# Patient Record
Sex: FEMALE | Race: WHITE | NOT HISPANIC OR LATINO | Employment: OTHER | URBAN - METROPOLITAN AREA
[De-identification: names, ages, dates, MRNs, and addresses within clinical notes are randomized per-mention and may not be internally consistent; named-entity substitution may affect disease eponyms.]

---

## 2021-06-02 ENCOUNTER — RECORDS - HEALTHEAST (OUTPATIENT)
Dept: ADMINISTRATIVE | Facility: CLINIC | Age: 66
End: 2021-06-02

## 2024-02-09 ENCOUNTER — TRANSFERRED RECORDS (OUTPATIENT)
Dept: HEALTH INFORMATION MANAGEMENT | Facility: CLINIC | Age: 69
End: 2024-02-09

## 2024-02-09 ENCOUNTER — MEDICAL CORRESPONDENCE (OUTPATIENT)
Dept: HEALTH INFORMATION MANAGEMENT | Facility: CLINIC | Age: 69
End: 2024-02-09

## 2024-04-09 ENCOUNTER — MEDICAL CORRESPONDENCE (OUTPATIENT)
Dept: HEALTH INFORMATION MANAGEMENT | Facility: CLINIC | Age: 69
End: 2024-04-09
Payer: COMMERCIAL

## 2024-04-09 ENCOUNTER — TRANSFERRED RECORDS (OUTPATIENT)
Dept: HEALTH INFORMATION MANAGEMENT | Facility: CLINIC | Age: 69
End: 2024-04-09
Payer: COMMERCIAL

## 2024-04-15 ENCOUNTER — OFFICE VISIT (OUTPATIENT)
Dept: CARDIOLOGY | Facility: CLINIC | Age: 69
End: 2024-04-15
Payer: COMMERCIAL

## 2024-04-15 VITALS
RESPIRATION RATE: 16 BRPM | DIASTOLIC BLOOD PRESSURE: 64 MMHG | SYSTOLIC BLOOD PRESSURE: 130 MMHG | OXYGEN SATURATION: 98 % | HEART RATE: 63 BPM | WEIGHT: 98.4 LBS

## 2024-04-15 DIAGNOSIS — I05.0 RHEUMATIC MITRAL STENOSIS: ICD-10-CM

## 2024-04-15 DIAGNOSIS — I10 PRIMARY HYPERTENSION: ICD-10-CM

## 2024-04-15 DIAGNOSIS — I06.1 RHEUMATIC AORTIC INSUFFICIENCY: Primary | ICD-10-CM

## 2024-04-15 PROCEDURE — 99203 OFFICE O/P NEW LOW 30 MIN: CPT | Performed by: INTERNAL MEDICINE

## 2024-04-15 RX ORDER — ACETAMINOPHEN 500 MG
1000 TABLET ORAL
COMMUNITY
Start: 2023-02-24

## 2024-04-15 RX ORDER — LANOLIN ALCOHOL/MO/W.PET/CERES
CREAM (GRAM) TOPICAL DAILY
COMMUNITY
Start: 2023-10-01

## 2024-04-15 RX ORDER — IPRATROPIUM BROMIDE 21 UG/1
2 SPRAY, METERED NASAL DAILY
COMMUNITY
Start: 2023-10-23

## 2024-04-15 RX ORDER — MELOXICAM 15 MG/1
15 TABLET ORAL PRN
COMMUNITY
Start: 2024-02-01

## 2024-04-15 RX ORDER — SODIUM PHOSPHATE,MONO-DIBASIC 19G-7G/118
1 ENEMA (ML) RECTAL DAILY
COMMUNITY

## 2024-04-15 RX ORDER — TRAZODONE HYDROCHLORIDE 50 MG/1
50 TABLET, FILM COATED ORAL
COMMUNITY
Start: 2024-01-02

## 2024-04-15 RX ORDER — FLUTICASONE PROPIONATE AND SALMETEROL 100; 50 UG/1; UG/1
1 POWDER RESPIRATORY (INHALATION) EVERY 12 HOURS
COMMUNITY

## 2024-04-15 RX ORDER — DICYCLOMINE HYDROCHLORIDE 10 MG/1
CAPSULE ORAL EVERY MORNING
COMMUNITY
Start: 2024-03-01

## 2024-04-15 RX ORDER — ESTRADIOL 0.5 MG/1
0.5 TABLET ORAL DAILY
COMMUNITY
Start: 2023-10-23

## 2024-04-15 RX ORDER — AMLODIPINE BESYLATE 5 MG/1
5 TABLET ORAL DAILY
COMMUNITY

## 2024-04-15 RX ORDER — LEVOTHYROXINE SODIUM 50 UG/1
50 TABLET ORAL DAILY
COMMUNITY
Start: 2023-10-24

## 2024-04-15 NOTE — LETTER
4/15/2024    Soledad Rashid MD  [facility] [address] [place] [id number]    RE: Paola Austin       Dear Colleague,     I had the pleasure of seeing Paola Austin in the [facility].      Thank you, Dr. Soledad Rashid, for asking the [facility] team to see Ms. Paola Austin to evaluate rheumatic valve disease.    Assessment/Recommendations   Assessment:    1.  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  She did have a history of rheumatic fever as a child which confirms the origin.  Had never been told that she had a heart murmur until recently.  No symptoms of exertional dyspnea, orthopnea, PND but does note some occasional palpitations which happen about once per month.  She does have evidence of severe left atrial enlargement and is at risk for the development of atrial fibrillation.  For now we will continue to monitor her for any increase in symptoms and if these occur, I would favor a 14 to 30-day event monitor.  With regards to SBE prophylaxis, she is considered intermediate risk and thus does not have to have prophylaxis.  If she is more comfortable in doing so, would look to treat her with azithromycin or doxycycline as she reports an allergy to penicillin.  2.  Essential hypertension, controlled with amlodipine.  If her blood pressure begins to run consistently greater than 130 systolic, I would favor increasing amlodipine to 7.5 mg daily to help minimize aortic insufficiency.    Plan:  1.  Continue current medications  2.  Consider increasing amlodipine to 7.5 mg daily if blood pressure begins to run greater than 130 systolic  3.  Plan echocardiogram in 1 year to follow-up on her valve disease.  Consider event monitor if she begins to note increased symptoms of palpitations.       History of Present Illness    Ms. Paola Austin is a 68 year old female with history of  rheumatic fever as a child, essential hypertension who was recently noted to have a heart murmur on examination.  This led to an echocardiogram demonstrating evidence of rheumatic mitral stenosis which is mild to moderate in severity, aortic valve sclerosis with mild to moderate insufficiency and severe left atrial enlargement.  She is now referred here for further recommendation.  Denies any symptoms of exertional dyspnea or decline in exercise capacity.  Also denies any excessive fatigue.  No history of orthopnea, PND although occasionally will have some ankle edema.  Has noted some palpitations occurring about once per month.  Describes it as a flip-flop in her chest which is most notable when she is laying in bed at night.    ECG (personally reviewed): No ECG    Cardiac Imaging Studies (personally reviewed):   Narrative  Performed by [employer]  Summary    1. Normal left ventricular chamber size with no resting regional wall motion abnormalities and calculated ejection fraction 59%.    2. Normal right ventricular chamber size and systolic function.    3. Normal estimated peak right ventricular/pulmonary artery systolic pressure (34 mmHg).    4. Severe left atrial and mild right atrial chamber enlargement.    5. Rheumatic/postinfllammatory changes of the mitral valve with mild-moderate mitral valve regurgitation and moderate mitral valve stenosis (mean diastolic gradient 5 mmHg with valve area 1.7 by planimetry).    6. Sclerotic aortic valve with mild-moderate aortic valve regurgitation with no stenosis (mean systolic gradient 6 mmhg).    7. Normal-caliber inferior vena cava with reduced inspiratory collapse consistent with high-normal central venous pressures.    8. A prior study is not available for comparison.       Physical Examination Review of Systems   /64 (BP Location: Left arm, Patient Position: Sitting, Cuff Size: Adult Small)   Pulse 63   Resp 16   Wt 44.6 kg (98 lb 6.4 oz)   SpO2 98%   There is  no height or weight on file to calculate BMI.  Wt Readings from Last 3 Encounters:   04/15/24 44.6 kg (98 lb 6.4 oz)     General Appearance:   Awake, Alert, No acute distress.   HEENT:  No scleral icterus; the mucous membranes were pink and moist.   Neck: No cervical bruits or jugular venous distention    Chest: The spine was straight. The chest was symmetric.   Lungs:   Respirations unlabored; the lungs are clear to auscultation. No wheezing   Cardiovascular:   Rate and rhythm.  S1 normal, S2 normal.  2/6 crescendo decrescendo systolic murmur heard best at the left upper sternal border.  No significant diastolic murmur appreciated.   Abdomen:  No organomegaly, masses, bruits, or tenderness. Bowels sounds are present   Extremities: Trace foot edema bilaterally.   Skin: No xanthelasma. Warm, Dry.   Musculoskeletal: No tenderness.   Neurologic: Mood and affect are appropriate.    Enc Vitals  BP: 130/64  Pulse: 63  Resp: 16  SpO2: 98 %  Weight: 44.6 kg (98 lb 6.4 oz)                                         Medical History  Surgical History Family History Social History   Past Medical History:   Diagnosis Date    Aortic valve disorder     aortic insufficiency    Mitral valve stenosis     Palpitations     Rheumatic fever with heart involvement     No past surgical history on file. Family History   Problem Relation Age of Onset    Multiple Sclerosis Sister     Breast Cancer Sister     Social History     Socioeconomic History    Marital status:      Spouse name: Not on file    Number of children: Not on file    Years of education: Not on file    Highest education level: Not on file   Occupational History    Not on file   Tobacco Use    Smoking status: Former     Current packs/day: 0.00     Types: Cigarettes     Quit date:      Years since quittin.3    Smokeless tobacco: Never   Substance and Sexual Activity    Alcohol use: Never    Drug use: Not on file    Sexual activity: Not on file   Other Topics Concern  "   Not on file   Social History Narrative    Not on file     Social Determinants of Health     Financial Resource Strain: Not on file   Food Insecurity: Not on file   Transportation Needs: Not on file   Physical Activity: Not on file   Stress: Not on file   Social Connections: Not on file   Interpersonal Safety: Not on file   Housing Stability: Not on file          Medications  Allergies   Current Outpatient Medications   Medication Sig Dispense Refill    acetaminophen (TYLENOL) 500 MG tablet Take 1,000 mg by mouth      amLODIPine (NORVASC) 5 MG tablet Take 5 mg by mouth daily      cholecalciferol (VITAMIN D3) 25 mcg (1000 units) capsule Take by mouth daily      cyanocobalamin (VITAMIN B-12) 1000 MCG tablet Take by mouth daily      dicyclomine (BENTYL) 10 MG capsule Take by mouth every morning      estradiol (ESTRACE) 0.5 MG tablet Take 0.5 mg by mouth daily      fluticasone-salmeterol (ADVAIR) 100-50 MCG/ACT inhaler Inhale 1 puff into the lungs every 12 hours      glucosamine-chondroitin 500-400 MG CAPS per capsule Take 1 tablet by mouth daily      ipratropium (ATROVENT) 0.03 % nasal spray Spray 2 sprays into both nostrils daily      levothyroxine (SYNTHROID/LEVOTHROID) 50 MCG tablet Take 50 mcg by mouth daily      meloxicam (MOBIC) 15 MG tablet Take 15 mg by mouth as needed      traZODone (DESYREL) 50 MG tablet Take 50 mg by mouth nightly as needed        Allergies   Allergen Reactions    Cats Shortness Of Breath    Dogs Shortness Of Breath    Dust Mites Shortness Of Breath    Mold Shortness Of Breath    Trees Shortness Of Breath    Losartan Potassium Dizziness and Nausea    Metoprolol-Hydrochlorothiazide     Montelukast Sodium Swelling    Short Ragweed Pollen Ext Itching    Hydrochlorothiazide Rash    Penicillins Hives and Rash    Sulfa Antibiotics Rash and Swelling         Lab Results    Chemistry/lipid CBC Cardiac Enzymes/BNP/TSH/INR   No results for input(s): \"TRIG\", \"CHOLHDL\", \"LDL\", \"CREATININE\", \"BUN\", " "\"NA\", \"CO2\" in the last 69197 hours.    Invalid input(s): \"TOTALCHOL\", \"LDLCALC\", \"K\", \"CL\" No results for input(s): \"WBC\", \"HGB\", \"HCT\", \"MCV\", \"PLT\" in the last 02450 hours. No results for input(s): \"CKTOTAL\", \"CKMB\", \"TROPONINI\", \"BNP\", \"TSH\", \"INR\" in the last 45700 hours.    Invalid input(s): \"CKMBINDEX\"     A total of 33 minutes was spent reviewing patient's medical records, obtaining history and performing examination, as well as discussing diagnoses/ recommendations with patient and answering all questions.                      Thank you for allowing me to participate in the care of your patient.      Sincerely,     Rekha Wen MD     [facility]  cc:   Soledad Rashid MD  [facility]  [address] [place], [place] [id number]      "

## 2024-04-15 NOTE — PROGRESS NOTES
Thank you, Dr. Soleadd Rashid, for asking the [facility] team to see Ms. Paola Austin to evaluate rheumatic valve disease.    Assessment/Recommendations   Assessment:    1.  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  She did have a history of rheumatic fever as a child which confirms the origin.  Had never been told that she had a heart murmur until recently.  No symptoms of exertional dyspnea, orthopnea, PND but does note some occasional palpitations which happen about once per month.  She does have evidence of severe left atrial enlargement and is at risk for the development of atrial fibrillation.  For now we will continue to monitor her for any increase in symptoms and if these occur, I would favor a 14 to 30-day event monitor.  With regards to SBE prophylaxis, she is considered intermediate risk and thus does not have to have prophylaxis.  If she is more comfortable in doing so, would look to treat her with azithromycin or doxycycline as she reports an allergy to penicillin.  2.  Essential hypertension, controlled with amlodipine.  If her blood pressure begins to run consistently greater than 130 systolic, I would favor increasing amlodipine to 7.5 mg daily to help minimize aortic insufficiency.    Plan:  1.  Continue current medications  2.  Consider increasing amlodipine to 7.5 mg daily if blood pressure begins to run greater than 130 systolic  3.  Plan echocardiogram in 1 year to follow-up on her valve disease.  Consider event monitor if she begins to note increased symptoms of palpitations.       History of Present Illness    Ms. Poala Austin is a 68 year old female with history of rheumatic fever as a child, essential hypertension who was recently noted to have a heart murmur on examination.  This led to an echocardiogram demonstrating evidence of rheumatic mitral stenosis which is mild to moderate in  severity, aortic valve sclerosis with mild to moderate insufficiency and severe left atrial enlargement.  She is now referred here for further recommendation.  Denies any symptoms of exertional dyspnea or decline in exercise capacity.  Also denies any excessive fatigue.  No history of orthopnea, PND although occasionally will have some ankle edema.  Has noted some palpitations occurring about once per month.  Describes it as a flip-flop in her chest which is most notable when she is laying in bed at night.    ECG (personally reviewed): No ECG    Cardiac Imaging Studies (personally reviewed):   Narrative  Performed by [employer]  Summary    1. Normal left ventricular chamber size with no resting regional wall motion abnormalities and calculated ejection fraction 59%.    2. Normal right ventricular chamber size and systolic function.    3. Normal estimated peak right ventricular/pulmonary artery systolic pressure (34 mmHg).    4. Severe left atrial and mild right atrial chamber enlargement.    5. Rheumatic/postinfllammatory changes of the mitral valve with mild-moderate mitral valve regurgitation and moderate mitral valve stenosis (mean diastolic gradient 5 mmHg with valve area 1.7 by planimetry).    6. Sclerotic aortic valve with mild-moderate aortic valve regurgitation with no stenosis (mean systolic gradient 6 mmhg).    7. Normal-caliber inferior vena cava with reduced inspiratory collapse consistent with high-normal central venous pressures.    8. A prior study is not available for comparison.       Physical Examination Review of Systems   /64 (BP Location: Left arm, Patient Position: Sitting, Cuff Size: Adult Small)   Pulse 63   Resp 16   Wt 44.6 kg (98 lb 6.4 oz)   SpO2 98%   There is no height or weight on file to calculate BMI.  Wt Readings from Last 3 Encounters:   04/15/24 44.6 kg (98 lb 6.4 oz)     General Appearance:   Awake, Alert, No acute distress.   HEENT:  No scleral icterus; the mucous  membranes were pink and moist.   Neck: No cervical bruits or jugular venous distention    Chest: The spine was straight. The chest was symmetric.   Lungs:   Respirations unlabored; the lungs are clear to auscultation. No wheezing   Cardiovascular:   Rate and rhythm.  S1 normal, S2 normal.  2/6 crescendo decrescendo systolic murmur heard best at the left upper sternal border.  No significant diastolic murmur appreciated.   Abdomen:  No organomegaly, masses, bruits, or tenderness. Bowels sounds are present   Extremities: Trace foot edema bilaterally.   Skin: No xanthelasma. Warm, Dry.   Musculoskeletal: No tenderness.   Neurologic: Mood and affect are appropriate.    Enc Vitals  BP: 130/64  Pulse: 63  Resp: 16  SpO2: 98 %  Weight: 44.6 kg (98 lb 6.4 oz)                                         Medical History  Surgical History Family History Social History   Past Medical History:   Diagnosis Date    Aortic valve disorder     aortic insufficiency    Mitral valve stenosis     Palpitations     Rheumatic fever with heart involvement     No past surgical history on file. Family History   Problem Relation Age of Onset    Multiple Sclerosis Sister     Breast Cancer Sister     Social History     Socioeconomic History    Marital status:      Spouse name: Not on file    Number of children: Not on file    Years of education: Not on file    Highest education level: Not on file   Occupational History    Not on file   Tobacco Use    Smoking status: Former     Current packs/day: 0.00     Types: Cigarettes     Quit date:      Years since quittin.3    Smokeless tobacco: Never   Substance and Sexual Activity    Alcohol use: Never    Drug use: Not on file    Sexual activity: Not on file   Other Topics Concern    Not on file   Social History Narrative    Not on file     Social Determinants of Health     Financial Resource Strain: Not on file   Food Insecurity: Not on file   Transportation Needs: Not on file   Physical  "Activity: Not on file   Stress: Not on file   Social Connections: Not on file   Interpersonal Safety: Not on file   Housing Stability: Not on file          Medications  Allergies   Current Outpatient Medications   Medication Sig Dispense Refill    acetaminophen (TYLENOL) 500 MG tablet Take 1,000 mg by mouth      amLODIPine (NORVASC) 5 MG tablet Take 5 mg by mouth daily      cholecalciferol (VITAMIN D3) 25 mcg (1000 units) capsule Take by mouth daily      cyanocobalamin (VITAMIN B-12) 1000 MCG tablet Take by mouth daily      dicyclomine (BENTYL) 10 MG capsule Take by mouth every morning      estradiol (ESTRACE) 0.5 MG tablet Take 0.5 mg by mouth daily      fluticasone-salmeterol (ADVAIR) 100-50 MCG/ACT inhaler Inhale 1 puff into the lungs every 12 hours      glucosamine-chondroitin 500-400 MG CAPS per capsule Take 1 tablet by mouth daily      ipratropium (ATROVENT) 0.03 % nasal spray Spray 2 sprays into both nostrils daily      levothyroxine (SYNTHROID/LEVOTHROID) 50 MCG tablet Take 50 mcg by mouth daily      meloxicam (MOBIC) 15 MG tablet Take 15 mg by mouth as needed      traZODone (DESYREL) 50 MG tablet Take 50 mg by mouth nightly as needed        Allergies   Allergen Reactions    Cats Shortness Of Breath    Dogs Shortness Of Breath    Dust Mites Shortness Of Breath    Mold Shortness Of Breath    Trees Shortness Of Breath    Losartan Potassium Dizziness and Nausea    Metoprolol-Hydrochlorothiazide     Montelukast Sodium Swelling    Short Ragweed Pollen Ext Itching    Hydrochlorothiazide Rash    Penicillins Hives and Rash    Sulfa Antibiotics Rash and Swelling         Lab Results    Chemistry/lipid CBC Cardiac Enzymes/BNP/TSH/INR   No results for input(s): \"TRIG\", \"CHOLHDL\", \"LDL\", \"CREATININE\", \"BUN\", \"NA\", \"CO2\" in the last 20558 hours.    Invalid input(s): \"TOTALCHOL\", \"LDLCALC\", \"K\", \"CL\" No results for input(s): \"WBC\", \"HGB\", \"HCT\", \"MCV\", \"PLT\" in the last 34805 hours. No results for input(s): \"CKTOTAL\", " "\"CKMB\", \"TROPONINI\", \"BNP\", \"TSH\", \"INR\" in the last 71423 hours.    Invalid input(s): \"CKMBINDEX\"     A total of 33 minutes was spent reviewing patient's medical records, obtaining history and performing examination, as well as discussing diagnoses/ recommendations with patient and answering all questions.                      "

## 2025-02-05 ENCOUNTER — ANCILLARY PROCEDURE (OUTPATIENT)
Dept: CARDIOLOGY | Facility: CLINIC | Age: 70
End: 2025-02-05
Attending: FAMILY MEDICINE
Payer: COMMERCIAL

## 2025-02-05 DIAGNOSIS — I09.1 RHEUMATIC VALVULAR DISEASE: ICD-10-CM

## 2025-02-05 LAB — LVEF ECHO: NORMAL

## 2025-02-05 PROCEDURE — 93306 TTE W/DOPPLER COMPLETE: CPT | Performed by: INTERNAL MEDICINE
